# Patient Record
Sex: FEMALE | Race: WHITE | NOT HISPANIC OR LATINO | Employment: FULL TIME | URBAN - NONMETROPOLITAN AREA
[De-identification: names, ages, dates, MRNs, and addresses within clinical notes are randomized per-mention and may not be internally consistent; named-entity substitution may affect disease eponyms.]

---

## 2022-08-12 ENCOUNTER — PREP FOR SURGERY (OUTPATIENT)
Dept: OTHER | Facility: HOSPITAL | Age: 47
End: 2022-08-12

## 2022-08-12 DIAGNOSIS — N20.0 KIDNEY STONE: Primary | ICD-10-CM

## 2022-08-16 NOTE — H&P
[facility] History and Physical  VI HWANG  46-year-old; :1975;;female  [address];[place] [address]  Ins: 1) SANDOVAL/MARCELLA  Employer: [employer]  MRN:[id number]  LAZARA MONTELONGO MD  [facility]  Allergies  Bactrim Unknown  bunetanide Unknown  cepalosporins Unknown  clindamycin Unknown  diabenese Unknown  ethacrynic acid Unknown  hydoclycemics Unknown  Lasix Unknown  oxidents Unknown  phenazopyridine Unknown  sulfa drug Unknown  sulfaSALAzine Unknown  thiazide-like diuretic Unknown  vancomycin Unknown  nitrofurantoin Unknown  cipro Unknown  Levaquin Unknown  Pyridium Unknown  Xylocaine Topical Unknown  Current Medications  promethazine 12.5 mg oral tablet May take 1 tablet by mouth every 6 hours as needed for nausea.  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  Psychiatric History  Patient is generally satisfied with life and has no depression or thoughts of suicide. Has anxiety.  Family History  Essential Primary Hypertension  Family History Of Diseases Of The Blood And  Blood-Forming Organs And Certain Disorders  Involving The Immune Mechanism  Disorder Of Kidney And Ureter, Unspecified  Father Family History Of Malignant Neoplasm, Unspecified  Father  Heart Disease, Unspecified  Father Type 2 Diabetes Mellitus Without  Chief Complaint  Kidney Stone  History of Present Illness  VI HWANG is a 46-year-old female here for evaluation. She has a history of bilateral kidney stone and ureteric stones. 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 She has an 8 mm left ureteric stone. She is status post left j-stent placement and needs to be scheduled for left CRULLS with [facility] at [place]. Currently denies any fever or chills. Denies any pain. States hasn't have a pain pill since Saturday. Denies any hematuria.  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 No hydronephrosis.  06/20/2022 - Tiny renal calculi without hydronephrosis. Mild dilation of the right renal pelvis is however seen.  CT abdomen and pelvis:  08/01/2022 - 8 mm calculus at the left ureteropelvic junction with some mild left-sided hydronephrosis.  05/03/2022 - 6 mm diameter right UPJ stone causing moderate to severe right hydronephrosis. The stone measures 437 hounsfield units.  There are additional nonobstructing calyceal stones within both kidneys. No other significant findings.  01/17/2022 - No acute intra-abdominal process. Improved inflammatory changes about the right kidney.  KUB:  06/20/2022 - single view of the KUB area, comparted to study from 06/13/2022, shows persistent bilateral renal calculi. A calcified density is again noted inferior to the right SI joint.  03/07/2022 - left-sided nephrolithiasis with interval removal of the ureteral stent.  Location: Kidney.  Severity: no pain  Onset / Duration: onging  Modifying factors: left ureter stent  Associated signs and symptoms: no fever or chills.  Review of Systems  Eyes: Denies: blurry vision, pain in the eyes and double vision  ENMT: Denies:  ear pain, sore throat and sinus problems  Cardiovascular: Denies: chest pain, varicose veins, angina and syncope  Respiratory: Denies: shortness of breath, wheezing and frequent cough  Gastrointestinal: Denies: abdominal pain, nausea, vomiting, indigestion and heartburn  Genitourinary: Reports: other symptoms (see HPI)  Musculoskeletal: Denies: joint pain, neck pain and back pain  Integumentary: Denies: skin rash, boils and persistent itch  Neurological: Denies: tremors, dizzy spells and numbness / tingling  Psychiatric: Reports: generally satisfied with life ; Denies: depression, suicidal ideation and anxiety  Mother Alive  Father   Brother Alive  Sister Alive  Half Sister  Social History  Currently smokes 1/2 pack of cigarettes a day. Does not drink alcohol. Employed. . Lives with her kids and boyfriend.  Imm/Inj/Office Meds History Comments  PT HAS NOT HAD FLU OR PNEUMONIA VACCINE. NOT HAD COVID VACCINE.  Review of Systems  Endocrine: Denies: Excessive thirst, cold intolerance, heat intolerance and  tired/sluggish  Hematologic/Lymphatic: Denies: swollen glands and blood clotting problem  Allergic/Immunologic: Denies: hayfever  Constitutional: Denies: fever, chills and weight loss  Vital Signs  VITAL SIGNS NOT TAKEN DUE TO COVID 19 RESTRICTIONS  Weight: 245 (lb) Resp Rate: 18 (/min)  Height: 69 (in) BMI: 36.18  Never smoker  Exam  General appearance: The patient appears well developed and well nourished, in no apparent acute distress.  Assessment of hearing: Hearing appears normal.  Examination of neck: Neck appears supple.  Assessment of respiratory effort: Respiratory effort appears normal. No apparent distress.  Inspection of breasts: Deferred.  Obtain stool sample: Stool specimen for occult blood is not indicated.  Examination of gait and station: Normal gait and stature.  Head and neck (Assessment of range of motion): Adequate ROM noted in all extremities.  Head and neck (Assessment of  stability): Ambulates without assistance.  Inspection of skin and subcutaneous tissue: Exam of the skin is within normal limits. The color and skin turgor are normal. No lesions, bruises, rashes, or jaundice.  Orientation to time, place and person: Oriented to person, place, and time.  Mood and affect: Mood and affect are normal.  Data Review  Medications and chart reviewed. History and physical form/ROS reviewed and no changes noted.   Assessment - Kidney stone  DX:  Kidney stone  ICD-10: N20.0  Assessment Notes  8 left ureteric stone  Plan  Plan Notes:  Cystoscopy left retrograde ureteroscopy laser lithotripsy stent placement.  Plan  Discussion:  Discussed my findings and plan of action and reasoning behind decision making. All questions were answered. Risk and benefits discussed with patient. Patient wishes to proceed.  Instructions:  Patient is instructed to call with any problems. Patient is instructed to call if the condition worsens. Patient is instructed to call with any changes in condition. Patient is instructed to call if she has any intractable pain, fever, chills, nausea, or vomiting. INCREASE FLUIDS. IF PAIN WORSENS/ UNCONTROLLED OR TEMPERATURE >101.0 GO IMMEDIATELY TO ER.

## 2022-08-18 ENCOUNTER — HOSPITAL ENCOUNTER (OUTPATIENT)
Facility: HOSPITAL | Age: 47
Setting detail: HOSPITAL OUTPATIENT SURGERY
Discharge: HOME | End: 2022-08-18
Attending: UROLOGY | Admitting: UROLOGY

## 2022-08-18 ENCOUNTER — ANESTHESIA (OUTPATIENT)
Dept: PERIOP | Facility: HOSPITAL | Age: 47
End: 2022-08-18

## 2022-08-18 ENCOUNTER — APPOINTMENT (OUTPATIENT)
Dept: GENERAL RADIOLOGY | Facility: HOSPITAL | Age: 47
End: 2022-08-18

## 2022-08-18 ENCOUNTER — ANESTHESIA EVENT (OUTPATIENT)
Dept: PERIOP | Facility: HOSPITAL | Age: 47
End: 2022-08-18

## 2022-08-18 VITALS
HEIGHT: 69 IN | BODY MASS INDEX: 31.51 KG/M2 | HEART RATE: 124 BPM | SYSTOLIC BLOOD PRESSURE: 121 MMHG | RESPIRATION RATE: 20 BRPM | TEMPERATURE: 98.9 F | DIASTOLIC BLOOD PRESSURE: 70 MMHG | OXYGEN SATURATION: 93 % | WEIGHT: 212.74 LBS

## 2022-08-18 DIAGNOSIS — N20.0 KIDNEY STONE: ICD-10-CM

## 2022-08-18 LAB
AMPHET+METHAMPHET UR QL: NEGATIVE
AMPHETAMINES UR QL: NEGATIVE
BARBITURATES UR QL SCN: NEGATIVE
BENZODIAZ UR QL SCN: NEGATIVE
BILIRUB UR QL STRIP: NEGATIVE
BUPRENORPHINE SERPL-MCNC: NEGATIVE NG/ML
CANNABINOIDS SERPL QL: NEGATIVE
CLARITY UR: ABNORMAL
COCAINE UR QL: NEGATIVE
COLOR UR: YELLOW
GLUCOSE UR STRIP-MCNC: NEGATIVE MG/DL
HGB UR QL STRIP.AUTO: ABNORMAL
KETONES UR QL STRIP: NEGATIVE
LEUKOCYTE ESTERASE UR QL STRIP.AUTO: ABNORMAL
METHADONE UR QL SCN: NEGATIVE
NITRITE UR QL STRIP: NEGATIVE
OPIATES UR QL: NEGATIVE
OXYCODONE UR QL SCN: NEGATIVE
PCP UR QL SCN: NEGATIVE
PH UR STRIP.AUTO: 6 [PH] (ref 5–9)
PROPOXYPH UR QL: NEGATIVE
PROT UR QL STRIP: ABNORMAL
SP GR UR STRIP: 1.02 (ref 1–1.03)
TRICYCLICS UR QL SCN: NEGATIVE
UROBILINOGEN UR QL STRIP: ABNORMAL

## 2022-08-18 PROCEDURE — 25010000002 MEROPENEM PER 100 MG: Performed by: UROLOGY

## 2022-08-18 PROCEDURE — 25010000002 HYDROMORPHONE 1 MG/ML SOLUTION: Performed by: NURSE ANESTHETIST, CERTIFIED REGISTERED

## 2022-08-18 PROCEDURE — 93005 ELECTROCARDIOGRAM TRACING: CPT | Performed by: ANESTHESIOLOGY

## 2022-08-18 PROCEDURE — C2617 STENT, NON-COR, TEM W/O DEL: HCPCS | Performed by: UROLOGY

## 2022-08-18 PROCEDURE — C1769 GUIDE WIRE: HCPCS | Performed by: UROLOGY

## 2022-08-18 PROCEDURE — 25010000002 ONDANSETRON PER 1 MG: Performed by: NURSE ANESTHETIST, CERTIFIED REGISTERED

## 2022-08-18 PROCEDURE — 25010000002 FENTANYL CITRATE (PF) 50 MCG/ML SOLUTION: Performed by: NURSE ANESTHETIST, CERTIFIED REGISTERED

## 2022-08-18 PROCEDURE — 93010 ELECTROCARDIOGRAM REPORT: CPT | Performed by: INTERNAL MEDICINE

## 2022-08-18 PROCEDURE — 25010000002 PROPOFOL 10 MG/ML EMULSION: Performed by: NURSE ANESTHETIST, CERTIFIED REGISTERED

## 2022-08-18 PROCEDURE — 80306 DRUG TEST PRSMV INSTRMNT: CPT | Performed by: ANESTHESIOLOGY

## 2022-08-18 PROCEDURE — 25010000002 MIDAZOLAM PER 1 MG: Performed by: NURSE ANESTHETIST, CERTIFIED REGISTERED

## 2022-08-18 PROCEDURE — C1758 CATHETER, URETERAL: HCPCS | Performed by: UROLOGY

## 2022-08-18 PROCEDURE — 81003 URINALYSIS AUTO W/O SCOPE: CPT | Performed by: UROLOGY

## 2022-08-18 PROCEDURE — C1894 INTRO/SHEATH, NON-LASER: HCPCS | Performed by: UROLOGY

## 2022-08-18 PROCEDURE — 25010000002 IOPAMIDOL 61 % SOLUTION: Performed by: UROLOGY

## 2022-08-18 PROCEDURE — 74420 UROGRAPHY RTRGR +-KUB: CPT

## 2022-08-18 DEVICE — URETERAL STENT
Type: IMPLANTABLE DEVICE | Site: URETER | Status: FUNCTIONAL
Brand: POLARIS™ ULTRA

## 2022-08-18 RX ORDER — OXYCODONE AND ACETAMINOPHEN 7.5; 325 MG/1; MG/1
1-2 TABLET ORAL EVERY 4 HOURS PRN
Qty: 10 TABLET | Refills: 0 | Status: SHIPPED | OUTPATIENT
Start: 2022-08-18

## 2022-08-18 RX ORDER — GENTAMICIN SULFATE 40 MG/ML
60 INJECTION, SOLUTION INTRAMUSCULAR; INTRAVENOUS ONCE
Status: DISCONTINUED | OUTPATIENT
Start: 2022-08-18 | End: 2022-08-18 | Stop reason: HOSPADM

## 2022-08-18 RX ORDER — FENTANYL CITRATE 50 UG/ML
INJECTION, SOLUTION INTRAMUSCULAR; INTRAVENOUS AS NEEDED
Status: DISCONTINUED | OUTPATIENT
Start: 2022-08-18 | End: 2022-08-18 | Stop reason: SURG

## 2022-08-18 RX ORDER — DIAZEPAM 5 MG/1
5 TABLET ORAL ONCE
Status: COMPLETED | OUTPATIENT
Start: 2022-08-18 | End: 2022-08-18

## 2022-08-18 RX ORDER — FLUMAZENIL 0.1 MG/ML
0.2 INJECTION INTRAVENOUS AS NEEDED
Status: DISCONTINUED | OUTPATIENT
Start: 2022-08-18 | End: 2022-08-18 | Stop reason: HOSPADM

## 2022-08-18 RX ORDER — ACETAMINOPHEN 325 MG/1
650 TABLET ORAL ONCE AS NEEDED
Status: DISCONTINUED | OUTPATIENT
Start: 2022-08-18 | End: 2022-08-18 | Stop reason: HOSPADM

## 2022-08-18 RX ORDER — PROMETHAZINE HYDROCHLORIDE 25 MG/1
25 SUPPOSITORY RECTAL ONCE AS NEEDED
Status: DISCONTINUED | OUTPATIENT
Start: 2022-08-18 | End: 2022-08-18 | Stop reason: HOSPADM

## 2022-08-18 RX ORDER — ONDANSETRON 2 MG/ML
4 INJECTION INTRAMUSCULAR; INTRAVENOUS ONCE AS NEEDED
Status: COMPLETED | OUTPATIENT
Start: 2022-08-18 | End: 2022-08-18

## 2022-08-18 RX ORDER — EPHEDRINE SULFATE 50 MG/ML
5 INJECTION, SOLUTION INTRAVENOUS ONCE AS NEEDED
Status: DISCONTINUED | OUTPATIENT
Start: 2022-08-18 | End: 2022-08-18 | Stop reason: HOSPADM

## 2022-08-18 RX ORDER — PROPOFOL 10 MG/ML
VIAL (ML) INTRAVENOUS AS NEEDED
Status: DISCONTINUED | OUTPATIENT
Start: 2022-08-18 | End: 2022-08-18 | Stop reason: SURG

## 2022-08-18 RX ORDER — PROMETHAZINE HYDROCHLORIDE 25 MG/1
25 TABLET ORAL ONCE AS NEEDED
Status: DISCONTINUED | OUTPATIENT
Start: 2022-08-18 | End: 2022-08-18 | Stop reason: HOSPADM

## 2022-08-18 RX ORDER — ACETAMINOPHEN 650 MG/1
650 SUPPOSITORY RECTAL ONCE AS NEEDED
Status: DISCONTINUED | OUTPATIENT
Start: 2022-08-18 | End: 2022-08-18 | Stop reason: HOSPADM

## 2022-08-18 RX ORDER — SODIUM CHLORIDE, SODIUM GLUCONATE, SODIUM ACETATE, POTASSIUM CHLORIDE AND MAGNESIUM CHLORIDE 526; 502; 368; 37; 30 MG/100ML; MG/100ML; MG/100ML; MG/100ML; MG/100ML
1000 INJECTION, SOLUTION INTRAVENOUS CONTINUOUS PRN
Status: DISCONTINUED | OUTPATIENT
Start: 2022-08-18 | End: 2022-08-18 | Stop reason: HOSPADM

## 2022-08-18 RX ORDER — MIDAZOLAM HYDROCHLORIDE 1 MG/ML
INJECTION INTRAMUSCULAR; INTRAVENOUS AS NEEDED
Status: DISCONTINUED | OUTPATIENT
Start: 2022-08-18 | End: 2022-08-18 | Stop reason: SURG

## 2022-08-18 RX ORDER — DIPHENHYDRAMINE HYDROCHLORIDE 50 MG/ML
12.5 INJECTION INTRAMUSCULAR; INTRAVENOUS
Status: DISCONTINUED | OUTPATIENT
Start: 2022-08-18 | End: 2022-08-18 | Stop reason: HOSPADM

## 2022-08-18 RX ORDER — NALOXONE HCL 0.4 MG/ML
0.4 VIAL (ML) INJECTION AS NEEDED
Status: DISCONTINUED | OUTPATIENT
Start: 2022-08-18 | End: 2022-08-18 | Stop reason: HOSPADM

## 2022-08-18 RX ORDER — ONDANSETRON 2 MG/ML
INJECTION INTRAMUSCULAR; INTRAVENOUS AS NEEDED
Status: DISCONTINUED | OUTPATIENT
Start: 2022-08-18 | End: 2022-08-18 | Stop reason: SURG

## 2022-08-18 RX ORDER — ACETAMINOPHEN 500 MG
1000 TABLET ORAL ONCE
Status: COMPLETED | OUTPATIENT
Start: 2022-08-18 | End: 2022-08-18

## 2022-08-18 RX ORDER — MEPERIDINE HYDROCHLORIDE 25 MG/ML
12.5 INJECTION INTRAMUSCULAR; INTRAVENOUS; SUBCUTANEOUS
Status: DISCONTINUED | OUTPATIENT
Start: 2022-08-18 | End: 2022-08-18 | Stop reason: HOSPADM

## 2022-08-18 RX ADMIN — DIAZEPAM 5 MG: 5 TABLET ORAL at 09:00

## 2022-08-18 RX ADMIN — SODIUM CHLORIDE, SODIUM GLUCONATE, SODIUM ACETATE, POTASSIUM CHLORIDE AND MAGNESIUM CHLORIDE 1000 ML: 526; 502; 368; 37; 30 INJECTION, SOLUTION INTRAVENOUS at 08:09

## 2022-08-18 RX ADMIN — ACETAMINOPHEN 1000 MG: 500 TABLET ORAL at 13:49

## 2022-08-18 RX ADMIN — Medication 1 G: at 10:08

## 2022-08-18 RX ADMIN — ONDANSETRON 4 MG: 2 INJECTION INTRAMUSCULAR; INTRAVENOUS at 10:11

## 2022-08-18 RX ADMIN — MIDAZOLAM HYDROCHLORIDE 2 MG: 1 INJECTION, SOLUTION INTRAMUSCULAR; INTRAVENOUS at 09:58

## 2022-08-18 RX ADMIN — HYDROMORPHONE HYDROCHLORIDE 0.5 MG: 1 INJECTION, SOLUTION INTRAMUSCULAR; INTRAVENOUS; SUBCUTANEOUS at 12:10

## 2022-08-18 RX ADMIN — FENTANYL CITRATE 50 MCG: 50 INJECTION INTRAMUSCULAR; INTRAVENOUS at 10:20

## 2022-08-18 RX ADMIN — SODIUM CHLORIDE, SODIUM GLUCONATE, SODIUM ACETATE, POTASSIUM CHLORIDE AND MAGNESIUM CHLORIDE 1000 ML: 526; 502; 368; 37; 30 INJECTION, SOLUTION INTRAVENOUS at 12:10

## 2022-08-18 RX ADMIN — FENTANYL CITRATE 50 MCG: 50 INJECTION INTRAMUSCULAR; INTRAVENOUS at 10:02

## 2022-08-18 RX ADMIN — ONDANSETRON 4 MG: 2 INJECTION INTRAMUSCULAR; INTRAVENOUS at 12:16

## 2022-08-18 RX ADMIN — PROPOFOL 100 MG: 10 INJECTION, EMULSION INTRAVENOUS at 10:02

## 2022-08-18 NOTE — ANESTHESIA PREPROCEDURE EVALUATION
Anesthesia Evaluation     Patient summary reviewed and Nursing notes reviewed   history of anesthetic complications: prolonged sedation  NPO Solid Status: > 8 hours  NPO Liquid Status: > 8 hours           Airway   Mallampati: II  TM distance: >3 FB  Neck ROM: full  possible difficult intubation  Dental    (+) poor dentation    Comment: Gap between upper incisors with remaining dentition in poor repair.    Pulmonary     breath sounds clear to auscultation  (+) a smoker Current Smoked day of surgery, asthma,  (-) shortness of breath  Cardiovascular     Rhythm: regular  Rate: normal    (+) hypertension,   (-) STONER, murmur      Neuro/Psych  (+) psychiatric history Anxiety,    GI/Hepatic/Renal/Endo    (+) obesity,   renal disease stones,     Musculoskeletal (-) negative ROS    Abdominal   (+) obese,    Substance History - negative use     OB/GYN negative ob/gyn ROS         Other - negative ROS                     Anesthesia Plan    ASA 3     general     intravenous induction     Anesthetic plan, risks, benefits, and alternatives have been provided, discussed and informed consent has been obtained with: patient.        CODE STATUS:

## 2022-08-18 NOTE — ANESTHESIA PROCEDURE NOTES
Airway  Urgency: elective    Date/Time: 8/18/2022 10:05 AM  Airway not difficult    General Information and Staff    Patient location during procedure: OR  CRNA/CAA: Theodore Yee CRNA    Indications and Patient Condition  Indications for airway management: airway protection    Preoxygenated: yes  Mask difficulty assessment: 1 - vent by mask    Final Airway Details  Final airway type: supraglottic airway      Successful airway: I-gel  Size 4    Number of attempts at approach: 1  Assessment: lips, teeth, and gum same as pre-op and atraumatic intubation

## 2022-08-18 NOTE — ANESTHESIA POSTPROCEDURE EVALUATION
Patient: Crista Menendez    Procedure Summary     Date: 08/18/22 Room / Location: [facility] OR 02 / [facility] OR    Anesthesia Start: 0959 Anesthesia Stop: 1054    Procedure: CYSTOSCOPY LEFT RETROGRADE URETEROSCOPY LASER LITHOTRIPSY STENT INSERTION (Left ) Diagnosis:       Kidney stone      (Kidney stone [N20.0])    Surgeons: Murali Schneider MD Provider: Brodie Sheldon MD    Anesthesia Type: general ASA Status: 3          Anesthesia Type: general    Vitals  No vitals data found for the desired time range.          Post Anesthesia Care and Evaluation    Patient location during evaluation: PACU  Patient participation: complete - patient participated  Level of consciousness: awake and alert  Pain management: adequate    Airway patency: patent  Anesthetic complications: No anesthetic complications  PONV Status: none  Cardiovascular status: acceptable  Respiratory status: acceptable  Hydration status: acceptable    Comments: ---------------------------               08/18/22                      1054         ---------------------------   BP:          141/85         Pulse:         80           Resp:          20           Temp:   97.4 °F (36.3 °C)   SpO2:          95%         ---------------------------

## 2022-08-18 NOTE — OP NOTE
CYSTOSCOPY URETEROSCOPY RETROGRADE PYELOGRAM HOLMIUM LASER STENT INSERTION  Procedure Note    Crista Menendez  8/18/2022    Pre-op Diagnosis:   Kidney stone [N20.0]    Post-op Diagnosis:     Post-Op Diagnosis Codes:     * Kidney stone [N20.0]    Procedure(s):  CYSTOSCOPY LEFT RETROGRADE URETEROSCOPY LASER LITHOTRIPSY STENT INSERTION    Surgeon(s):  Murali Schneider MD    Anesthesia: Choice    Staff:   Circulator: Shannon Olmos RN  Radiology Technologist: Gladys Woods  Scrub Person: Rosalia Trevino  Assistant: Madelyn Flaherty CSA    Assistant: Madelyn Flaherty CSA was responsible for performing the following activities: Irrigation and their skilled assistance was necessary for the success of this case.     Estimated Blood Loss: none    Specimens:                ID Type Source Tests Collected by Time   A (Not marked as sent) : LEFT URETERAL STENT, FOR GROSS EXAM ONLY Tissue Ureter, Left TISSUE EXAM, [facility] ([facility]) Murali Schneider MD 8/18/2022 1036         Drains:   Urethral Catheter 22 Fr. (Active)       Findings: Left renal stone with retained stent    Complications: None    Indications: Same    Description of Procedure: Patient brought surgery placed in dorsolithotomy position sterile prep and drape genitalia in routine fashion patient over 20 Cymro scope in the bladder left double-J stent was pulled down we put an 035 Glidewire passed up the stent in the left renal pelvis put a retrograde catheter over top that we shot retrograde studies to get position put the 035 Glidewire back in the left renal pelvis then passed the left view ureteroscope over the top guidewire into the left renal pelvis we found 2 stones 1 midpole left lower pole we went in the each of these calyces with 200 µm fiber.  Then using the dust mode fragmentation of fragmented stone and small pieces flushed out to debris then over top guidewire through-the-scope placed a 6 x 28 double-J stent.  Fluoroscopy showed  J stent was good position bladder was drained scope was removed patient taken recovery had home procedure well    Murali Schneider MD     Date: 8/18/2022  Time: 10:50 CDT

## 2022-08-19 LAB
QT INTERVAL: 392 MS
QTC INTERVAL: 441 MS

## 2022-08-24 LAB — REF LAB TEST METHOD: NORMAL

## 2022-12-28 ENCOUNTER — PREP FOR SURGERY (OUTPATIENT)
Dept: OTHER | Facility: HOSPITAL | Age: 47
End: 2022-12-28

## 2022-12-28 DIAGNOSIS — N20.1 CALCULUS OF URETER: Primary | ICD-10-CM

## 2023-01-04 PROBLEM — N20.1 CALCULUS OF URETER: Status: ACTIVE | Noted: 2023-01-04

## 2023-01-10 RX ORDER — ERGOCALCIFEROL 1.25 MG/1
50000 CAPSULE ORAL WEEKLY
COMMUNITY

## 2023-01-10 RX ORDER — LISINOPRIL 10 MG/1
10 TABLET ORAL DAILY
COMMUNITY

## 2023-01-10 RX ORDER — ATORVASTATIN CALCIUM 40 MG/1
40 TABLET, FILM COATED ORAL DAILY
COMMUNITY

## 2023-01-10 NOTE — H&P
[facility] HISTORY AND PHYSICAL  VI HWANG  47-year-old; :1975;;female  [address];[place] [address]  Ins: 1) SANDOVAL/MARCELLA  Employer: [employer]  MRN:[id number]  LAZARA MONTELONGO MD  [facility]  Allergies  Bactrim Unknown  bunetanide Unknown  cepalosporins Unknown  clindamycin Unknown  diabenese Unknown  ethacrynic acid Unknown  hydoclycemics Unknown  Lasix Unknown  oxidents Unknown  phenazopyridine Unknown  sulfa drug Unknown  sulfaSALAzine Unknown  thiazide-like diuretic Unknown  vancomycin Unknown  nitrofurantoin Unknown  cipro Unknown  Levaquin Unknown  Pyridium Unknown  Xylocaine Topical Unknown  Current Medications  terbinafine 1% topical cream apply thin layer to affected area twice daily  Athlete's Foot 1% topical cream apply thin layer to affected area twice daily  * Medications Reconciled  Medications Prescribed this Visit  Percocet 7.5 mg-325 mg oral tablet 1 tablet every 6 hours as needed for pain  promethazine 12.5 mg oral tablet May take 1 tablet by mouth every 6 hours as needed for nausea.  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  Psychiatric History  Patient is generally satisfied with life and has no depression or thoughts of suicide. Has anxiety.  Father Family History Of Malignant Neoplasm, Unspecified  MALIGNANT MELANOMA AND BLOOD CANCER  Father Heart Disease,  Unspecified ALSO MOTHERS SIDE  Father Type 2 Diabetes Mellitus Without Complications ALSO MOTHERS SIDE  Essential Primary Hypertension MOTHERS SIDE  Family History Of Diseases Of The Blood And  Blood-Forming Organs And Certain Disorders  Involving The Immune Mechanism  Disorder Of Kidney And Ureter, Unspecified MOTHERS SIDE  Mother Alive  Father   CANCER  Brother Alive  Sister Alive  Half Sister  Social History  Currently smokes 1/2 pack of cigarettes a day; VAPES. Does not drink alcohol. Employed. . Lives with her kids and FIANCE.  Imm/Inj/Office Meds History Comments  PT HAS NOT HAD FLU OR PNEUMONIA VACCINE. NOT HAD COVID VACCINE.  Chief Complaint  Kidney Stone  History of Present Illness  VI HWANG is a 47-year-old female here for evaluation. She has a history of bilateral kidney stone and ureteric stones. 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 She had mostly recently an 8 mm left ureteric stone with hydronephrosis. She is now s/p left CRULLS with stent removal 22. Here today with complaints of right lower abdominal and flank pain. Renal ultrasound notes mild right hydronephrosis and stones at the lower pole of the left kidney, ranging in size up to about 1.2 cm. KUB is reporting as being unchanged from prior imaging in November but no ureter stone mentioned. No hematuria. No Dysuria. No fever. Some intermittent nausea. She is a little emotional today secondary to ongoing issue with kidney stone and being off work. Would like to speak with a dietitian at some point.  Past medical history: HTN, recurrent kidney stone, UTIs, Hemoglobin Tang[place] blood disorder.  KUB-2022- Unchanged calcifications overlying both renal shadows most consitent with nephroliths.  Renal ultrasound-2022-mild right hydronephrosis and stones at the lower pole of the left kidney, ranging in size up to about 1.2 cm.  Renal  ultrasound-10/19/2022-Unremarkable renal ultrasound. The left UPJ stone and the calyceal stones seen on the CT dated 08/01/2022 are not clearly visualized on today's ultrasound.  Renal ultrasound-07/18/2022-Unremarkable renal ultrasound. No hydronephrosis.  Renal ultrasound-06/20/2022-Tiny renal calculi without hydronephrosis. Mild dilation of the right renal pelvis is however seen.  CT abdomen and pelvis-08/01/2022-8 mm calculus at the left ureteropelvic junction with some mild left-sided hydronephrosis.  CT abdomen/pelvis-05/03/2022-6 mm diameter right UPJ stone causing moderate to severe right hydronephrosis. The stone measures 437 hounsfield units. There are additional nonobstructing calyceal stones within both kidneys. No other significant findings.  CT abdomen/pelvis-01/17/2022-No acute intra-abdominal process. Improved inflammatory changes about the right kidney.  KUB-11/15/2022-Mild stool burden in the colon. Nonobstructive bowel gas pattern. There is an 8 mm left renal calculus and a 10 mm right renal calculus. There is an 8 mm calcific density projecting over the left sacrum which is new from prior and may represent inferior migration of previously noted left ureteral calculus. Right upper quadrant cholecystectomy clips noted. Thin linear radiopaque foreign bodies project over the left upper abdomen, nonspecific and may represent ingested material.  KUB-06/20/2022-single view of the KUB area, comparted to study from 06/13/2022, shows persistent bilateral renal calculi. A calcified density is again noted inferior to the right SI joint.  KUB-03/07/2022-left-sided nephrolithiasis with interval removal of the ureteral stent.  Location: Kidney.  Severity: mild  Onset / Duration: ongoing  Timing: intermittent  Modifying factors: crulls  Associated signs and symptoms: left flank pain, no fever  Review of Systems  Eyes:Reports: blurry vision ; Denies: pain in the eyes and double vision  ENMT:Denies: ear pain, sore  throat and sinus problems  Cardiovascular:Denies: chest pain, varicose veins, angina and syncope  Respiratory:Reports: frequent cough ; Denies: shortness of breath and wheezing  Gastrointestinal:Denies: abdominal pain, nausea, vomiting, indigestion and heartburn  Genitourinary:Reports: other symptoms (see HPI)  Musculoskeletal:Reports: joint pain, neck pain and back pain  Integumentary:Denies: skin rash, boils and persistent itch  Neurological:Reports: numbness / tingling ; Denies: tremors and dizzy spells  Psychiatric:Reports: generally satisfied with life and anxiety ; Denies: depression and suicidal ideation  Endocrine:Reports: tired/sluggish ; Denies: Excessive thirst, cold intolerance and heat intolerance  Hematologic/Lymphatic:Denies: swollen glands and blood clotting problem  Allergic/Immunologic:Denies: hayfever  Constitutional:Denies: fever, chills and weight loss  Vital Signs  VITAL SIGNS NOT TAKEN DUE TO COVID 19 RESTRICTIONS  Weight: 245 (lb) Resp Rate: 18 (/min)  Height: 69 (in) BMI: 36.18  Current every day smoker  Exam  General appearance: The patient appears well developed and well nourished, in no apparent acute distress.  Assessment of hearing: Hearing appears normal.  Examination of neck: Neck appears supple.  Assessment of respiratory effort: Respiratory effort appears normal. No apparent distress.  Inspection of breasts: Deferred.  Examination of abdomen: No nausea, vomiting, or diarrhea.  Obtain stool sample: Stool specimen for occult blood is not indicated.  Genitourinary: voiding ok  Examination of gait and station: Normal gait and stature.  Head and neck (Assessment of stability): Ambulates without assistance.  Inspection of skin and subcutaneous tissue: Exam of the skin is within normal limits. The color and skin turgor are normal. No lesions, bruises, rashes, or jaundice.  Orientation to time, place and person: Oriented to person, place, and time.  Mood and affect: Mood and affect are  normal.  Data Review  Medications and chart reviewed. History and physical form/ROS reviewed and no changes noted. Previous encounter reviewed. Last form done 10/20/2022.  E&M Code  OFFICE/OUTPATIENT VISIT, ANNI (34554)  Assessment - Kidney stone  DX:  Kidney stone  ICD-10: N20.0  Assessment Notes  Plan  Plan Notes:  Schedule CYSTOSCOPY RIGHT RETROGRADE URETEROSCOPY LASER LITHOTRIPSY AND STENT PLACEMENT.  Discussion:  Discussed my findings and plan of action and reasoning behind decision making. All questions were answered. FINDINGS WERE DISCUSSED WITH PATIENT. RISKS AND BENEFITS EXPLAINED. PATIENT WISHES TO PROCEED.  Instructions:  Patient is instructed to call with any problems. Patient is instructed to call if the condition worsens. Patient is instructed to call with any changes in condition. Patient is instructed to call if she has any intractable pain, fever, chills, nausea, or vomiting. INCREASE FLUIDS. IF PAIN WORSENS/ UNCONTROLLED OR TEMPERATURE  >101.0 GO IMMEDIATELY TO ER.

## 2023-01-11 ENCOUNTER — ANESTHESIA (OUTPATIENT)
Dept: PERIOP | Facility: HOSPITAL | Age: 48
End: 2023-01-11
Payer: COMMERCIAL

## 2023-01-11 ENCOUNTER — HOSPITAL ENCOUNTER (OUTPATIENT)
Facility: HOSPITAL | Age: 48
Setting detail: HOSPITAL OUTPATIENT SURGERY
Discharge: HOME | End: 2023-01-11
Attending: UROLOGY | Admitting: UROLOGY
Payer: COMMERCIAL

## 2023-01-11 ENCOUNTER — ANESTHESIA EVENT (OUTPATIENT)
Dept: PERIOP | Facility: HOSPITAL | Age: 48
End: 2023-01-11
Payer: COMMERCIAL

## 2023-01-11 ENCOUNTER — APPOINTMENT (OUTPATIENT)
Dept: GENERAL RADIOLOGY | Facility: HOSPITAL | Age: 48
End: 2023-01-11
Payer: COMMERCIAL

## 2023-01-11 VITALS
SYSTOLIC BLOOD PRESSURE: 154 MMHG | OXYGEN SATURATION: 92 % | HEIGHT: 69 IN | WEIGHT: 205.91 LBS | BODY MASS INDEX: 30.5 KG/M2 | RESPIRATION RATE: 16 BRPM | DIASTOLIC BLOOD PRESSURE: 80 MMHG | HEART RATE: 70 BPM | TEMPERATURE: 97.7 F

## 2023-01-11 DIAGNOSIS — N20.0 KIDNEY STONE: Primary | ICD-10-CM

## 2023-01-11 DIAGNOSIS — N20.1 CALCULUS OF URETER: ICD-10-CM

## 2023-01-11 LAB
AMPHET+METHAMPHET UR QL: NEGATIVE
AMPHETAMINES UR QL: NEGATIVE
BARBITURATES UR QL SCN: NEGATIVE
BENZODIAZ UR QL SCN: NEGATIVE
BILIRUB UR QL STRIP: ABNORMAL
BUPRENORPHINE SERPL-MCNC: NEGATIVE NG/ML
CANNABINOIDS SERPL QL: NEGATIVE
CLARITY UR: ABNORMAL
COCAINE UR QL: NEGATIVE
COLOR UR: ABNORMAL
GLUCOSE UR STRIP-MCNC: NEGATIVE MG/DL
HGB UR QL STRIP.AUTO: ABNORMAL
KETONES UR QL STRIP: ABNORMAL
LEUKOCYTE ESTERASE UR QL STRIP.AUTO: ABNORMAL
METHADONE UR QL SCN: NEGATIVE
NITRITE UR QL STRIP: NEGATIVE
OPIATES UR QL: NEGATIVE
OXYCODONE UR QL SCN: NEGATIVE
PCP UR QL SCN: NEGATIVE
PH UR STRIP.AUTO: 6 [PH] (ref 5–9)
PROPOXYPH UR QL: NEGATIVE
PROT UR QL STRIP: ABNORMAL
SP GR UR STRIP: 1.02 (ref 1–1.03)
TRICYCLICS UR QL SCN: NEGATIVE
UROBILINOGEN UR QL STRIP: ABNORMAL

## 2023-01-11 PROCEDURE — 25010000002 MEROPENEM PER 100 MG: Performed by: UROLOGY

## 2023-01-11 PROCEDURE — C2617 STENT, NON-COR, TEM W/O DEL: HCPCS | Performed by: UROLOGY

## 2023-01-11 PROCEDURE — 25010000002 ONDANSETRON PER 1 MG: Performed by: NURSE ANESTHETIST, CERTIFIED REGISTERED

## 2023-01-11 PROCEDURE — 25010000002 FENTANYL CITRATE (PF) 50 MCG/ML SOLUTION: Performed by: NURSE ANESTHETIST, CERTIFIED REGISTERED

## 2023-01-11 PROCEDURE — 25010000002 IOPAMIDOL 61 % SOLUTION: Performed by: UROLOGY

## 2023-01-11 PROCEDURE — 80306 DRUG TEST PRSMV INSTRMNT: CPT | Performed by: ANESTHESIOLOGY

## 2023-01-11 PROCEDURE — 25010000002 MIDAZOLAM PER 1 MG: Performed by: NURSE ANESTHETIST, CERTIFIED REGISTERED

## 2023-01-11 PROCEDURE — C1769 GUIDE WIRE: HCPCS | Performed by: UROLOGY

## 2023-01-11 PROCEDURE — C1758 CATHETER, URETERAL: HCPCS | Performed by: UROLOGY

## 2023-01-11 PROCEDURE — 74420 UROGRAPHY RTRGR +-KUB: CPT

## 2023-01-11 PROCEDURE — 81003 URINALYSIS AUTO W/O SCOPE: CPT | Performed by: UROLOGY

## 2023-01-11 PROCEDURE — 25010000002 PROPOFOL 200 MG/20ML EMULSION: Performed by: NURSE ANESTHETIST, CERTIFIED REGISTERED

## 2023-01-11 DEVICE — URETERAL STENT
Type: IMPLANTABLE DEVICE | Site: URETER | Status: FUNCTIONAL
Brand: PERCUFLEX™ PLUS

## 2023-01-11 RX ORDER — OXYCODONE AND ACETAMINOPHEN 7.5; 325 MG/1; MG/1
1 TABLET ORAL ONCE AS NEEDED
Status: COMPLETED | OUTPATIENT
Start: 2023-01-11 | End: 2023-01-11

## 2023-01-11 RX ORDER — NALOXONE HCL 0.4 MG/ML
0.4 VIAL (ML) INJECTION AS NEEDED
Status: DISCONTINUED | OUTPATIENT
Start: 2023-01-11 | End: 2023-01-11 | Stop reason: HOSPADM

## 2023-01-11 RX ORDER — ACETAMINOPHEN 650 MG/1
650 SUPPOSITORY RECTAL ONCE AS NEEDED
Status: DISCONTINUED | OUTPATIENT
Start: 2023-01-11 | End: 2023-01-11 | Stop reason: HOSPADM

## 2023-01-11 RX ORDER — FLUMAZENIL 0.1 MG/ML
0.2 INJECTION INTRAVENOUS AS NEEDED
Status: DISCONTINUED | OUTPATIENT
Start: 2023-01-11 | End: 2023-01-11 | Stop reason: HOSPADM

## 2023-01-11 RX ORDER — PROPOFOL 10 MG/ML
INJECTION, EMULSION INTRAVENOUS AS NEEDED
Status: DISCONTINUED | OUTPATIENT
Start: 2023-01-11 | End: 2023-01-11 | Stop reason: SURG

## 2023-01-11 RX ORDER — ONDANSETRON 2 MG/ML
4 INJECTION INTRAMUSCULAR; INTRAVENOUS ONCE AS NEEDED
Status: DISCONTINUED | OUTPATIENT
Start: 2023-01-11 | End: 2023-01-11 | Stop reason: HOSPADM

## 2023-01-11 RX ORDER — FENTANYL CITRATE 50 UG/ML
INJECTION, SOLUTION INTRAMUSCULAR; INTRAVENOUS AS NEEDED
Status: DISCONTINUED | OUTPATIENT
Start: 2023-01-11 | End: 2023-01-11 | Stop reason: SURG

## 2023-01-11 RX ORDER — PROMETHAZINE HYDROCHLORIDE 25 MG/1
25 SUPPOSITORY RECTAL ONCE AS NEEDED
Status: DISCONTINUED | OUTPATIENT
Start: 2023-01-11 | End: 2023-01-11 | Stop reason: HOSPADM

## 2023-01-11 RX ORDER — PROMETHAZINE HYDROCHLORIDE 25 MG/1
25 TABLET ORAL ONCE AS NEEDED
Status: DISCONTINUED | OUTPATIENT
Start: 2023-01-11 | End: 2023-01-11 | Stop reason: HOSPADM

## 2023-01-11 RX ORDER — ACETAMINOPHEN 325 MG/1
650 TABLET ORAL ONCE AS NEEDED
Status: DISCONTINUED | OUTPATIENT
Start: 2023-01-11 | End: 2023-01-11 | Stop reason: HOSPADM

## 2023-01-11 RX ORDER — DIPHENHYDRAMINE HYDROCHLORIDE 50 MG/ML
12.5 INJECTION INTRAMUSCULAR; INTRAVENOUS
Status: DISCONTINUED | OUTPATIENT
Start: 2023-01-11 | End: 2023-01-11 | Stop reason: HOSPADM

## 2023-01-11 RX ORDER — ONDANSETRON 2 MG/ML
INJECTION INTRAMUSCULAR; INTRAVENOUS AS NEEDED
Status: DISCONTINUED | OUTPATIENT
Start: 2023-01-11 | End: 2023-01-11 | Stop reason: SURG

## 2023-01-11 RX ORDER — MEPERIDINE HYDROCHLORIDE 25 MG/ML
12.5 INJECTION INTRAMUSCULAR; INTRAVENOUS; SUBCUTANEOUS
Status: DISCONTINUED | OUTPATIENT
Start: 2023-01-11 | End: 2023-01-11 | Stop reason: HOSPADM

## 2023-01-11 RX ORDER — EPHEDRINE SULFATE 50 MG/ML
5 INJECTION, SOLUTION INTRAVENOUS ONCE AS NEEDED
Status: DISCONTINUED | OUTPATIENT
Start: 2023-01-11 | End: 2023-01-11 | Stop reason: HOSPADM

## 2023-01-11 RX ORDER — SODIUM CHLORIDE, SODIUM GLUCONATE, SODIUM ACETATE, POTASSIUM CHLORIDE AND MAGNESIUM CHLORIDE 526; 502; 368; 37; 30 MG/100ML; MG/100ML; MG/100ML; MG/100ML; MG/100ML
1000 INJECTION, SOLUTION INTRAVENOUS CONTINUOUS PRN
Status: DISCONTINUED | OUTPATIENT
Start: 2023-01-11 | End: 2023-01-11 | Stop reason: HOSPADM

## 2023-01-11 RX ORDER — MIDAZOLAM HYDROCHLORIDE 1 MG/ML
INJECTION INTRAMUSCULAR; INTRAVENOUS AS NEEDED
Status: DISCONTINUED | OUTPATIENT
Start: 2023-01-11 | End: 2023-01-11 | Stop reason: SURG

## 2023-01-11 RX ORDER — OXYCODONE AND ACETAMINOPHEN 7.5; 325 MG/1; MG/1
1 TABLET ORAL EVERY 6 HOURS PRN
Qty: 10 TABLET | Refills: 0 | Status: SHIPPED | OUTPATIENT
Start: 2023-01-11 | End: 2023-01-18

## 2023-01-11 RX ADMIN — ONDANSETRON 4 MG: 2 INJECTION INTRAMUSCULAR; INTRAVENOUS at 09:49

## 2023-01-11 RX ADMIN — PROPOFOL 150 MG: 10 INJECTION, EMULSION INTRAVENOUS at 09:36

## 2023-01-11 RX ADMIN — MIDAZOLAM 2 MG: 1 INJECTION, SOLUTION INTRAMUSCULAR; INTRAVENOUS at 09:30

## 2023-01-11 RX ADMIN — OXYCODONE HYDROCHLORIDE AND ACETAMINOPHEN 1 TABLET: 7.5; 325 TABLET ORAL at 11:04

## 2023-01-11 RX ADMIN — Medication 1 G: at 10:02

## 2023-01-11 RX ADMIN — PROPOFOL 10 MG: 10 INJECTION, EMULSION INTRAVENOUS at 09:58

## 2023-01-11 RX ADMIN — FENTANYL CITRATE 25 MCG: 50 INJECTION, SOLUTION INTRAMUSCULAR; INTRAVENOUS at 09:30

## 2023-01-11 RX ADMIN — FENTANYL CITRATE 25 MCG: 50 INJECTION, SOLUTION INTRAMUSCULAR; INTRAVENOUS at 09:53

## 2023-01-11 RX ADMIN — SODIUM CHLORIDE, SODIUM GLUCONATE, SODIUM ACETATE, POTASSIUM CHLORIDE AND MAGNESIUM CHLORIDE 1000 ML: 526; 502; 368; 37; 30 INJECTION, SOLUTION INTRAVENOUS at 07:18

## 2023-01-11 NOTE — ANESTHESIA PREPROCEDURE EVALUATION
Anesthesia Evaluation     Patient summary reviewed and Nursing notes reviewed   history of anesthetic complications: prolonged sedation  NPO Solid Status: > 8 hours  NPO Liquid Status: > 8 hours           Airway   Mallampati: II  TM distance: >3 FB  Neck ROM: full  possible difficult intubation  Comment: Indications and Patient Condition  Indications for airway management: airway protection    Preoxygenated: yes  Mask difficulty assessment: 1 - vent by mask     Final Airway Details  Final airway type: supraglottic airway        Successful airway: I-gel  Size 4   Number of attempts at approach: 1  Assessment: lips, teeth, and gum same as pre-op and atraumatic intubation  Dental    (+) poor dentation    Comment: Gap between upper incisors with remaining dentition in poor repair.    Pulmonary     breath sounds clear to auscultation  (+) a smoker Current Abstained day of surgery, asthma (uses albuterol inhaler occasionaly ),  (-) shortness of breath, sleep apnea, rhonchi, decreased breath sounds, wheezes  Cardiovascular   Exercise tolerance: good (4-7 METS)    ECG reviewed  Rhythm: regular  Rate: normal    (+) hypertension less than 2 medications, hyperlipidemia,   (-) past MI, dysrhythmias, angina, STONER, murmur, cardiac stents, DVT    ROS comment: EK22  Normal sinus rhythm  Normal ECG  No previous ECGs available    Neuro/Psych  (+) psychiatric history Anxiety,    (-) seizures, TIA, CVA  GI/Hepatic/Renal/Endo    (+) obesity,   renal disease stones,   (-) GERD    Musculoskeletal     Abdominal   (+) obese,    Substance History   (+) drug use (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 Muñiz and Lorena Alexis in [facility]. .   Dr. Mares in [facility] is previous anesthesiologist. Pt reports to only being able to have certain mixture of anesthesia due to all her  allergies and blood disorder.                         Anesthesia Plan    ASA 3     general     intravenous induction     Anesthetic plan, risks, benefits, and alternatives have been provided, discussed and informed consent has been obtained with: patient and mother.        CODE STATUS:

## 2023-01-11 NOTE — OP NOTE
CYSTOSCOPY URETEROSCOPY RETROGRADE PYELOGRAM HOLMIUM LASER STENT INSERTION  Procedure Note    Crista Menendez  1/11/2023    Pre-op Diagnosis:   Calculus of ureter [N20.1]    Post-op Diagnosis:     Post-Op Diagnosis Codes:     * Calculus of ureter [N20.1]    Procedure(s):  CYSTOSCOPY RIGHT RETROGRADE URETEROSCOPY LASER LITOTRIPSY STENT INSERTION    Surgeon(s):  Murali Schneider MD    Anesthesia: General    Staff:   Circulator: Yaya Galloway RN; Amy Garcia RN  Radiology Technologist: Della Mcgee  Scrub Person: Melanie Smith  Assistant: Madelyn Flaherty CSA    Assistant: Madelyn Flaherty CSA was responsible for performing the following activities: Suction and Irrigation and their skilled assistance was necessary for the success of this case.     Estimated Blood Loss: minimal    Specimens:                None      Drains: * No LDAs found *    Findings: Mid upper pole right renal calculi    Complications: None    Indications: Same    Description of Procedure: Patient brought thyroid suite placed in the dorsolithotomy position sterile prep and drape genitalia in routine fashion I passed from 20 Yoruba scope in the bladder right ureter was catheterized 6 cc of contrast placed up the ureter collecting system filled.  With this in mind put 035 Glidewire back in the right renal pelvis and placed the left view ureteroscope over the top guidewire in the right renal pelvis and upper pole right kidney stone was noted we took a 200 µm fiber dust mode fragmentation mode fragmented the stone and small pieces.  Once is accomplished we surveyed the rest of the kidney patient had a calcification in the middle of the kidney for which could not be accessed possibly a calcified papilla or column of kimberly .It  did not move on flushing at all it was also hard to get contrast to go to that spot.  Once is accomplished we put the 035 Glidewire back in the right renal pelvis over the top guidewire  through-the-scope we placed a 8 x 26 double-J stent.  Stent string was taped to the lower abdomen routine fashion and patient taken recovery having tolerated the procedure well    Murali Schneider MD     Date: 1/11/2023  Time: 10:13 CST

## 2023-01-11 NOTE — ANESTHESIA PROCEDURE NOTES
Airway  Urgency: elective    Date/Time: 1/11/2023 9:37 AM  End Time:1/11/2023 9:37 AM  Airway not difficult    General Information and Staff    Patient location during procedure: OR  CRNA/CAA: Fito Rojas CRNA  SRNA: Teja Mejia SRNA  Indications and Patient Condition  Indications for airway management: airway protection    Preoxygenated: yes  MILS maintained throughout  Mask difficulty assessment: 0 - not attempted    Final Airway Details  Final airway type: supraglottic airway      Successful airway: I-gel  Size 4     Number of attempts at approach: 1  Assessment: lips, teeth, and gum same as pre-op and atraumatic intubation

## 2023-01-11 NOTE — ANESTHESIA POSTPROCEDURE EVALUATION
Patient: Crista Menendez    Procedure Summary     Date: 01/11/23 Room / Location: [facility] OR 02 / [facility] OR    Anesthesia Start: 0933 Anesthesia Stop: 1020    Procedure: CYSTOSCOPY RIGHT RETROGRADE URETEROSCOPY LASER LITOTRIPSY STENT INSERTION (Right) Diagnosis:       Calculus of ureter      (Calculus of ureter [N20.1])    Surgeons: Murali Schneider MD Provider: Fito Rojas CRNA    Anesthesia Type: general ASA Status: 3          Anesthesia Type: general    Vitals  No vitals data found for the desired time range.          Post Anesthesia Care and Evaluation    Patient location during evaluation: PACU  Patient participation: complete - patient participated  Level of consciousness: awake  Pain score: 0  Pain management: adequate    Airway patency: patent  Anesthetic complications: No anesthetic complications  PONV Status: none  Cardiovascular status: acceptable  Respiratory status: acceptable, room air and spontaneous ventilation  Hydration status: acceptable    Comments: HR 85  /58  SpO2 93%  RR 16  T. 98.0

## (undated) DEVICE — SOL IRR NACL 0.9PCT 3000ML

## (undated) DEVICE — CATH FOL CONT IRR 3WY 22F 5CC

## (undated) DEVICE — DRSNG SURESITE WNDW 2.38X2.75

## (undated) DEVICE — URETERAL ACCESS SHEATH SET: Brand: NAVIGATOR HD

## (undated) DEVICE — PK CYSTO LF 60

## (undated) DEVICE — FIBR LASR MOSES 200 DFL 2J 80HZ

## (undated) DEVICE — GLV SURG SENSICARE POLYISPRN W/ALOE PF LF 6.5 GRN STRL

## (undated) DEVICE — NITINOL WIRE WITH HYDROPHILIC TIP: Brand: SENSOR

## (undated) DEVICE — SYS IRR PUMP SGL ACTN VAC SYR 10CC

## (undated) DEVICE — CATH URETRL OPEN END W/CONNECT 5F 70CM

## (undated) DEVICE — SINGLE-USE DIGITAL FLEXIBLE URETEROSCOPE: Brand: LITHOVUE

## (undated) DEVICE — STERILE POLYISOPRENE POWDER-FREE SURGICAL GLOVES: Brand: PROTEXIS

## (undated) DEVICE — CONTAINER,SPECIMEN,OR STERILE,4OZ: Brand: MEDLINE

## (undated) DEVICE — SOL PVPI SPRY BETADINE 3OZ

## (undated) DEVICE — ADAPT/Y SCPE GATEWAY ADVNTGE

## (undated) DEVICE — GLV SURG NEOLON 2G PF LF 6.5 STRL

## (undated) DEVICE — SOL IRR H2O BTL 1000ML STRL

## (undated) DEVICE — ADHS LIQ MASTISOL 2/3ML

## (undated) DEVICE — SOL IRRG H2O PL/BG 1000ML STRL

## (undated) DEVICE — GLV SURG NEOPRENE SENSICARE PF SZ7.5 LF STRL

## (undated) DEVICE — SYRINGE,TOOMEY,IRRIGATION,70CC,STERILE: Brand: MEDLINE

## (undated) DEVICE — CATH URETRL OPN/END 5F70CM

## (undated) DEVICE — GLV SURG NEOPRN SENSICARE PF SZ/6.5 LF EA/1PR

## (undated) DEVICE — DRAINBAG,ANTI-REFLUX TOWER,L/F,2000ML,LL: Brand: MEDLINE